# Patient Record
Sex: MALE | Race: WHITE
[De-identification: names, ages, dates, MRNs, and addresses within clinical notes are randomized per-mention and may not be internally consistent; named-entity substitution may affect disease eponyms.]

---

## 2019-08-09 ENCOUNTER — HOSPITAL ENCOUNTER (EMERGENCY)
Dept: HOSPITAL 91 - E/R | Age: 44
LOS: 1 days | Discharge: HOME | End: 2019-08-10
Payer: SELF-PAY

## 2019-08-09 ENCOUNTER — HOSPITAL ENCOUNTER (EMERGENCY)
Dept: HOSPITAL 10 - E/R | Age: 44
LOS: 1 days | Discharge: HOME | End: 2019-08-10
Payer: SELF-PAY

## 2019-08-09 VITALS
WEIGHT: 147.05 LBS | HEIGHT: 65 IN | WEIGHT: 147.05 LBS | BODY MASS INDEX: 24.5 KG/M2 | BODY MASS INDEX: 24.5 KG/M2 | HEIGHT: 65 IN

## 2019-08-09 DIAGNOSIS — R07.9: ICD-10-CM

## 2019-08-09 DIAGNOSIS — R20.2: Primary | ICD-10-CM

## 2019-08-09 DIAGNOSIS — E11.9: ICD-10-CM

## 2019-08-09 LAB
ADD MAN DIFF?: NO
ANION GAP: 10 (ref 5–13)
BASOPHIL #: 0 10^3/UL (ref 0–0.1)
BASOPHILS %: 0.2 % (ref 0–2)
BLOOD UREA NITROGEN: 15 MG/DL (ref 7–20)
CALCIUM: 9.9 MG/DL (ref 8.4–10.2)
CARBON DIOXIDE: 26 MMOL/L (ref 21–31)
CHLORIDE: 101 MMOL/L (ref 97–110)
CREATINE KINASE: 836 IU/L (ref 23–200)
CREATININE: 0.94 MG/DL (ref 0.61–1.24)
EOSINOPHILS #: 0 10^3/UL (ref 0–0.5)
EOSINOPHILS %: 0.3 % (ref 0–7)
ETHANOL: < 10 MG/DL (ref 0–0)
GLUCOSE: 175 MG/DL (ref 70–220)
HEMATOCRIT: 45.8 % (ref 42–52)
HEMOGLOBIN: 15.7 G/DL (ref 14–18)
IMMATURE GRANS #M: 0.03 10^3/UL (ref 0–0.03)
IMMATURE GRANS % (M): 0.3 % (ref 0–0.43)
LYMPHOCYTES #: 1.4 10^3/UL (ref 0.8–2.9)
LYMPHOCYTES %: 12.5 % (ref 15–51)
MEAN CORPUSCULAR HEMOGLOBIN: 30.8 PG (ref 29–33)
MEAN CORPUSCULAR HGB CONC: 34.3 G/DL (ref 32–37)
MEAN CORPUSCULAR VOLUME: 90 FL (ref 82–101)
MEAN PLATELET VOLUME: 9.1 FL (ref 7.4–10.4)
MONOCYTE #: 1.2 10^3/UL (ref 0.3–0.9)
MONOCYTES %: 10.8 % (ref 0–11)
NEUTROPHIL #: 8.7 10^3/UL (ref 1.6–7.5)
NEUTROPHILS %: 75.9 % (ref 39–77)
NUCLEATED RED BLOOD CELLS #: 0 10^3/UL (ref 0–0)
NUCLEATED RED BLOOD CELLS%: 0 /100WBC (ref 0–0)
PLATELET COUNT: 217 10^3/UL (ref 140–415)
POTASSIUM: 4.3 MMOL/L (ref 3.5–5.1)
RED BLOOD COUNT: 5.09 10^6/UL (ref 4.7–6.1)
RED CELL DISTRIBUTION WIDTH: 13.7 % (ref 11.5–14.5)
SODIUM: 137 MMOL/L (ref 135–144)
TROPONIN-I: < 0.012 NG/ML (ref 0–0.12)
WHITE BLOOD COUNT: 11.5 10^3/UL (ref 4.8–10.8)

## 2019-08-09 PROCEDURE — 82550 ASSAY OF CK (CPK): CPT

## 2019-08-09 PROCEDURE — 71045 X-RAY EXAM CHEST 1 VIEW: CPT

## 2019-08-09 PROCEDURE — 85025 COMPLETE CBC W/AUTO DIFF WBC: CPT

## 2019-08-09 PROCEDURE — 80307 DRUG TEST PRSMV CHEM ANLYZR: CPT

## 2019-08-09 PROCEDURE — 84484 ASSAY OF TROPONIN QUANT: CPT

## 2019-08-09 PROCEDURE — 93005 ELECTROCARDIOGRAM TRACING: CPT

## 2019-08-09 PROCEDURE — 80048 BASIC METABOLIC PNL TOTAL CA: CPT

## 2019-08-09 PROCEDURE — 99285 EMERGENCY DEPT VISIT HI MDM: CPT

## 2019-08-09 PROCEDURE — 36415 COLL VENOUS BLD VENIPUNCTURE: CPT

## 2019-08-09 PROCEDURE — 70450 CT HEAD/BRAIN W/O DYE: CPT

## 2019-08-09 RX ADMIN — THIAMINE HYDROCHLORIDE 1 MLS/HR: 100 INJECTION, SOLUTION INTRAMUSCULAR; INTRAVENOUS at 23:15

## 2019-08-09 NOTE — ERD
ER Documentation


Chief Complaint


Chief Complaint





LEFT SIDED ARM NUMBNESS WITH TINGLING SENSATION SINCE TODAY





HPI


The patient is a 43-year-old male, presenting to the ER because of left arm 


tingling and numbness began around 10 AM today, he is under a lot of stress, 


only slept 2-hour last night, has been drinking a lot of coffee and soda to stay


awake for work, has not been eating or drinking.  The symptom is intermittent, 


he has been walking a lot, denies fever, chills, neck pain, chest pain, dyspnea,


abdominal pain, vomiting, dizzy, diarrhea.  He smokes and drinks, denies illicit


drug, under a lot of stress 





Past medical history: Diabetes mellitus diet-controlled only, history of kidney 


stones


Past surgical history: Cholecystectomy, cleft palate





ROS


All systems reviewed and are negative except as per history of present illness.





Allergies


Allergies:  


Coded Allergies:  


     No Known Allergy (Unverified , 10/22/14)





Physical Exam


Vitals





Vital Signs


  Date      Temp  Pulse  Resp  B/P (MAP)   Pulse Ox  O2          O2 Flow    FiO2


Time                                                 Delivery    Rate


   8/10/19  98.0    114    18      119/81        99  Room Air


     00:24                           (94)


    19          120    20      101/83        99  Room Air


     23:20                           (89)


    19  98.6    121    18      136/86        96


     21:40                          (103)





Physical Exam


 Const:      No acute distress.


 Head:        Atraumatic.


 Eyes:       Normal Conjunctiva.


 ENT:         Normal External Ears, Nose and Mouth.


 Neck:        Full range of motion.  No meningismus.


 Resp:         Clear to auscultation bilaterally.


 Cardio:       Regular rate and rhythm.


 Abd:         Soft,  non distended, normal bowel sounds, non tender.


 Skin:         No petechiae or rashes.


 Back:        No midline or flank tenderness.


 Ext:          No cyanosis, or edema.


 Neur:        Awake and alert. No focal deficit


 Psych:        Anxious


Result Diagram:  


19 2315                                                                     


          19 2315





Results 24 hrs





Laboratory Tests


              Test
                                  19
23:15


              White Blood Count                     11.5 10^3/ul


              Red Blood Count                       5.09 10^6/ul


              Hemoglobin                               15.7 g/dl


              Hematocrit                                  45.8 %


              Mean Corpuscular Volume                    90.0 fl


              Mean Corpuscular Hemoglobin                30.8 pg


              Mean Corpuscular Hemoglobin
Concent     34.3 g/dl 



              Red Cell Distribution Width                 13.7 %


              Platelet Count                         217 10^3/UL


              Mean Platelet Volume                        9.1 fl


              Immature Granulocytes %                    0.300 %


              Neutrophils %                               75.9 %


              Lymphocytes %                               12.5 %


              Monocytes %                                 10.8 %


              Eosinophils %                                0.3 %


              Basophils %                                  0.2 %


              Nucleated Red Blood Cells %            0.0 /100WBC


              Immature Granulocytes #              0.030 10^3/ul


              Neutrophils #                          8.7 10^3/ul


              Lymphocytes #                          1.4 10^3/ul


              Monocytes #                            1.2 10^3/ul


              Eosinophils #                          0.0 10^3/ul


              Basophils #                            0.0 10^3/ul


              Nucleated Red Blood Cells #            0.0 10^3/ul


              Sodium Level                            137 mmol/L


              Potassium Level                         4.3 mmol/L


              Chloride Level                          101 mmol/L


              Carbon Dioxide Level                     26 mmol/L


              Anion Gap                                       10


              Blood Urea Nitrogen                       15 mg/dl


              Creatinine                              0.94 mg/dl


              Est Glomerular Filtrat Rate
mL/min   > 60 mL/min 



              Glucose Level                            175 mg/dl


              Calcium Level                            9.9 mg/dl


              Creatine Kinase                           836 IU/L


              Troponin I                           < 0.012 ng/ml


              Ethyl Alcohol Level                  < 10.0 mg/dl





Current Medications


 Medications
   Dose
          Sig/Darren
       Start Time
   Status  Last


 (Trade)       Ordered        Route
 PRN     Stop Time              Admin
Dose


                              Reason                                Admin


 Sodium         1,000 ml @ 
   Q1H STAT
      19        DC            19


Chloride       1,000 mls/hr   IV
            22:56
 19                23:15



                                             23:55








Procedures/MDM


                          [place] [address]


                        Radiology Main Line: [phone]





                            DIAGNOSTIC IMAGING REPORT





 Patient: TANNER BRAMBILA   : 1975   Age: 43  Sex: M                        


       MR #:    [id number]   [facility]:   [id number]    DOS: 19 2259


Ordering MD: GIO VELÁSQUEZ MD   Location:  E/R   Room/Bed:                    


                       


                                        


PROCEDURE:   CT Brain without contrast. 


 


CLINICAL INDICATION:   Left-sided numbness


 


TECHNIQUE:   A CT of the brain was performed utilizing axial imaging from the 


skull base through the vertex without IV contrast.  Multiplanar reformatted 


images were made.  Images were reviewed on a PACS workstation.  The CTDIvol is 


36.21 mGy and the DLP is 634.23 mGycm.


 


One or more the following dose reduction techniques were utilized: Automated 


exposure control, adjustment of the mA and / or kV according to patient's size, 


or use of iterative reconstruction technique.  


 


DICOM images are available.


 


COMPARISON:   None 


 


FINDINGS:


There is no intracranial hemorrhage, mass effect, or midline shift.  No extra-


axial fluid collection is seen. The ventricles and sulci are normal in size and 


configuration. The density of the brain is normal, and the gray white matter 


differentiation appears well-preserved.  The visualized paranasal sinuses and 


osseous structures are grossly unremarkable. 


 


IMPRESSION:


 


1.  No evidence of acute intracranial pathology.


 


2.  The brain is normal in appearance. 


 


 


RPTAT: HJES


_____________________________________________ 


.Miquel Zuluaga MD, MD           Date    Time 


Electronically viewed and signed by .Miquel Zuluaga MD, MD on 2019 23:32 


 


D:  2019 23:32  T:  2019 23:32


.S/





CC: GIO VELÁSQUEZ MD





[phone]


                          [place] [address]


                        Radiology Main Line: [phone]





                            DIAGNOSTIC IMAGING REPORT





 Patient: TANNER BRAMBILA   : 1975   Age: 43  Sex: M                        


       MR #:    [id number]   Acct #:   [id number]    DOS: 19 2256


Ordering MD: GIO VELÁSQUEZ MD   Location:  E/R   Room/Bed:                    


                       


                                        


PROCEDURE:   One view chest radiograph. 


 


CLINICAL INDICATION:   Chest pain. 


 


TECHNIQUE:   An AP view of the chest was obtained. 


 


COMPARISON:   None. 


 


FINDINGS:


 


Mediastinum: Unremarkable.


Heart size:  Normal.


Pulmonary vasculature:  No visible engorgement.


Lungs:  Clear.


Costophrenic sulci:  Clear.


Bony structures:  Grossly unremarkable for age.


 


IMPRESSION:


 


1.  Unremarkable single view chest.


 


RPTAT:AAJJ


_____________________________________________ 


Physician Clemencia           Date    Time 


Electronically viewed and signed by Physician Clemencia on 2019 23:42 


 


D:  2019 23:42  T:  2019 23:42


GW/





CC: GIO VELÁSQUEZ MD





[id number]





EKG:                           Read by emergency physician


Rate/Rhythm:        Sinus tachycardia 119   beats/min


QRS, ST, T-waves:    No ST elevation, no T inversion


Impression:      Abnormal          EKG





MEDICAL MAKING DECISION: The patient is a 43-year-old male, presenting with 


paresthesias of unclear etiology, most likely due to acute stress and increased 


caffeine intake





The differential diagnoses considered include but are not limited to TIA, 


anxiety, depression, caffeine toxicity





Departure


Diagnosis:  


   Primary Impression:  


   Paresthesia


Condition:  Good


Comments


The patient's blood pressure was elevated (>120/80) but appears stable without 


evidence of hypertension emergency or urgency.  The patient was counseled about 


the risks of hypertension and urged to pursue outpatient monitoring and therapy 


within a week with their primary care physician.





I discussed the findings with the patient. I advised the patient to follow-up 


with the primary physician in about 1-2 days, sooner if needed and return if any


concern.





Disclaimer: Inadvertent spelling and grammatical errors are likely due to 


EHR/dictation software use and do not reflect on the overall quality of patient 


care. Also, please note that the electronic time recorded on this note does not 


necessarily reflect the actual time of the patient encounter.











GIO VELÁSQUEZ MD               Aug 9, 2019 22:33

## 2019-08-10 VITALS — RESPIRATION RATE: 18 BRPM | HEART RATE: 114 BPM | SYSTOLIC BLOOD PRESSURE: 119 MMHG | DIASTOLIC BLOOD PRESSURE: 81 MMHG
